# Patient Record
Sex: FEMALE | Race: ASIAN | NOT HISPANIC OR LATINO
[De-identification: names, ages, dates, MRNs, and addresses within clinical notes are randomized per-mention and may not be internally consistent; named-entity substitution may affect disease eponyms.]

---

## 2020-06-09 ENCOUNTER — TRANSCRIPTION ENCOUNTER (OUTPATIENT)
Age: 30
End: 2020-06-09

## 2020-12-10 ENCOUNTER — TRANSCRIPTION ENCOUNTER (OUTPATIENT)
Age: 30
End: 2020-12-10

## 2021-01-23 ENCOUNTER — TRANSCRIPTION ENCOUNTER (OUTPATIENT)
Age: 31
End: 2021-01-23

## 2021-02-16 ENCOUNTER — TRANSCRIPTION ENCOUNTER (OUTPATIENT)
Age: 31
End: 2021-02-16

## 2021-02-23 ENCOUNTER — TRANSCRIPTION ENCOUNTER (OUTPATIENT)
Age: 31
End: 2021-02-23

## 2021-04-30 ENCOUNTER — TRANSCRIPTION ENCOUNTER (OUTPATIENT)
Age: 31
End: 2021-04-30

## 2022-01-06 ENCOUNTER — TRANSCRIPTION ENCOUNTER (OUTPATIENT)
Age: 32
End: 2022-01-06

## 2022-01-17 ENCOUNTER — TRANSCRIPTION ENCOUNTER (OUTPATIENT)
Age: 32
End: 2022-01-17

## 2022-02-10 ENCOUNTER — LABORATORY RESULT (OUTPATIENT)
Age: 32
End: 2022-02-10

## 2022-02-11 ENCOUNTER — APPOINTMENT (OUTPATIENT)
Dept: ANTEPARTUM | Facility: CLINIC | Age: 32
End: 2022-02-11
Payer: COMMERCIAL

## 2022-02-11 ENCOUNTER — ASOB RESULT (OUTPATIENT)
Age: 32
End: 2022-02-11

## 2022-02-11 PROBLEM — Z00.00 ENCOUNTER FOR PREVENTIVE HEALTH EXAMINATION: Status: ACTIVE | Noted: 2022-02-11

## 2022-02-11 PROCEDURE — 76813 OB US NUCHAL MEAS 1 GEST: CPT

## 2022-02-11 PROCEDURE — 76801 OB US < 14 WKS SINGLE FETUS: CPT

## 2022-04-05 ENCOUNTER — APPOINTMENT (OUTPATIENT)
Dept: ANTEPARTUM | Facility: CLINIC | Age: 32
End: 2022-04-05
Payer: COMMERCIAL

## 2022-04-05 ENCOUNTER — ASOB RESULT (OUTPATIENT)
Age: 32
End: 2022-04-05

## 2022-04-05 PROCEDURE — 76817 TRANSVAGINAL US OBSTETRIC: CPT

## 2022-04-05 PROCEDURE — 76805 OB US >/= 14 WKS SNGL FETUS: CPT

## 2022-06-03 ENCOUNTER — ASOB RESULT (OUTPATIENT)
Age: 32
End: 2022-06-03

## 2022-06-03 ENCOUNTER — APPOINTMENT (OUTPATIENT)
Dept: ANTEPARTUM | Facility: CLINIC | Age: 32
End: 2022-06-03
Payer: COMMERCIAL

## 2022-06-03 PROCEDURE — 76816 OB US FOLLOW-UP PER FETUS: CPT

## 2022-06-03 PROCEDURE — 76819 FETAL BIOPHYS PROFIL W/O NST: CPT

## 2022-07-01 ENCOUNTER — ASOB RESULT (OUTPATIENT)
Age: 32
End: 2022-07-01

## 2022-07-01 ENCOUNTER — APPOINTMENT (OUTPATIENT)
Dept: ANTEPARTUM | Facility: CLINIC | Age: 32
End: 2022-07-01

## 2022-07-01 PROCEDURE — 76818 FETAL BIOPHYS PROFILE W/NST: CPT

## 2022-07-01 PROCEDURE — 76816 OB US FOLLOW-UP PER FETUS: CPT

## 2022-08-04 ENCOUNTER — ASOB RESULT (OUTPATIENT)
Age: 32
End: 2022-08-04

## 2022-08-04 ENCOUNTER — APPOINTMENT (OUTPATIENT)
Dept: ANTEPARTUM | Facility: CLINIC | Age: 32
End: 2022-08-04

## 2022-08-04 PROCEDURE — 76819 FETAL BIOPHYS PROFIL W/O NST: CPT

## 2022-08-04 PROCEDURE — 76816 OB US FOLLOW-UP PER FETUS: CPT

## 2022-08-09 ENCOUNTER — OUTPATIENT (OUTPATIENT)
Dept: OUTPATIENT SERVICES | Facility: HOSPITAL | Age: 32
LOS: 1 days | End: 2022-08-09
Payer: COMMERCIAL

## 2022-08-09 DIAGNOSIS — O26.899 OTHER SPECIFIED PREGNANCY RELATED CONDITIONS, UNSPECIFIED TRIMESTER: ICD-10-CM

## 2022-08-09 DIAGNOSIS — Z3A.00 WEEKS OF GESTATION OF PREGNANCY NOT SPECIFIED: ICD-10-CM

## 2022-08-09 PROCEDURE — 99214 OFFICE O/P EST MOD 30 MIN: CPT

## 2022-08-09 PROCEDURE — 99218: CPT | Mod: 25

## 2022-08-09 PROCEDURE — 59025 FETAL NON-STRESS TEST: CPT | Mod: 26

## 2022-08-09 PROCEDURE — 76815 OB US LIMITED FETUS(S): CPT | Mod: 26

## 2022-08-16 ENCOUNTER — INPATIENT (INPATIENT)
Facility: HOSPITAL | Age: 32
LOS: 2 days | Discharge: HOME | End: 2022-08-19
Attending: OBSTETRICS & GYNECOLOGY | Admitting: OBSTETRICS & GYNECOLOGY
Payer: COMMERCIAL

## 2022-08-16 VITALS — HEIGHT: 61 IN | WEIGHT: 169.09 LBS

## 2022-08-16 DIAGNOSIS — O26.899 OTHER SPECIFIED PREGNANCY RELATED CONDITIONS, UNSPECIFIED TRIMESTER: ICD-10-CM

## 2022-08-16 DIAGNOSIS — Z3A.00 WEEKS OF GESTATION OF PREGNANCY NOT SPECIFIED: ICD-10-CM

## 2022-08-16 LAB
BASOPHILS # BLD AUTO: 0.06 K/UL — SIGNIFICANT CHANGE UP (ref 0–0.2)
BASOPHILS NFR BLD AUTO: 0.4 % — SIGNIFICANT CHANGE UP (ref 0–2)
BLD GP AB SCN SERPL QL: NEGATIVE — SIGNIFICANT CHANGE UP
EOSINOPHIL # BLD AUTO: 0.07 K/UL — SIGNIFICANT CHANGE UP (ref 0–0.5)
EOSINOPHIL NFR BLD AUTO: 0.4 % — SIGNIFICANT CHANGE UP (ref 0–6)
HCT VFR BLD CALC: 38.7 % — SIGNIFICANT CHANGE UP (ref 34.5–45)
HGB BLD-MCNC: 12.6 G/DL — SIGNIFICANT CHANGE UP (ref 11.5–15.5)
IMM GRANULOCYTES NFR BLD AUTO: 1.5 % — SIGNIFICANT CHANGE UP (ref 0–1.5)
LYMPHOCYTES # BLD AUTO: 13.1 % — SIGNIFICANT CHANGE UP (ref 13–44)
LYMPHOCYTES # BLD AUTO: 2.21 K/UL — SIGNIFICANT CHANGE UP (ref 1–3.3)
MCHC RBC-ENTMCNC: 29.1 PG — SIGNIFICANT CHANGE UP (ref 27–34)
MCHC RBC-ENTMCNC: 32.6 GM/DL — SIGNIFICANT CHANGE UP (ref 32–36)
MCV RBC AUTO: 89.4 FL — SIGNIFICANT CHANGE UP (ref 80–100)
MONOCYTES # BLD AUTO: 0.92 K/UL — HIGH (ref 0–0.9)
MONOCYTES NFR BLD AUTO: 5.5 % — SIGNIFICANT CHANGE UP (ref 2–14)
NEUTROPHILS # BLD AUTO: 13.32 K/UL — HIGH (ref 1.8–7.4)
NEUTROPHILS NFR BLD AUTO: 79.1 % — HIGH (ref 43–77)
NRBC # BLD: 0 /100 WBCS — SIGNIFICANT CHANGE UP (ref 0–0)
PLATELET # BLD AUTO: 258 K/UL — SIGNIFICANT CHANGE UP (ref 150–400)
RBC # BLD: 4.33 M/UL — SIGNIFICANT CHANGE UP (ref 3.8–5.2)
RBC # FLD: 14.6 % — HIGH (ref 10.3–14.5)
RH IG SCN BLD-IMP: POSITIVE — SIGNIFICANT CHANGE UP
SARS-COV-2 RNA SPEC QL NAA+PROBE: NEGATIVE — SIGNIFICANT CHANGE UP
WBC # BLD: 16.84 K/UL — HIGH (ref 3.8–10.5)
WBC # FLD AUTO: 16.84 K/UL — HIGH (ref 3.8–10.5)

## 2022-08-16 PROCEDURE — 59025 FETAL NON-STRESS TEST: CPT | Mod: 26

## 2022-08-16 PROCEDURE — 76815 OB US LIMITED FETUS(S): CPT | Mod: 26

## 2022-08-16 PROCEDURE — 99218: CPT | Mod: 25

## 2022-08-16 RX ORDER — CITRIC ACID/SODIUM CITRATE 300-500 MG
15 SOLUTION, ORAL ORAL EVERY 6 HOURS
Refills: 0 | Status: DISCONTINUED | OUTPATIENT
Start: 2022-08-16 | End: 2022-08-17

## 2022-08-16 RX ORDER — FENTANYL/BUPIVACAINE/NS/PF 2MCG/ML-.1
250 PLASTIC BAG, INJECTION (ML) INJECTION
Refills: 0 | Status: DISCONTINUED | OUTPATIENT
Start: 2022-08-16 | End: 2022-08-17

## 2022-08-16 RX ORDER — CHLORHEXIDINE GLUCONATE 213 G/1000ML
1 SOLUTION TOPICAL ONCE
Refills: 0 | Status: DISCONTINUED | OUTPATIENT
Start: 2022-08-16 | End: 2022-08-17

## 2022-08-16 RX ORDER — SODIUM CHLORIDE 9 MG/ML
1000 INJECTION, SOLUTION INTRAVENOUS
Refills: 0 | Status: DISCONTINUED | OUTPATIENT
Start: 2022-08-16 | End: 2022-08-17

## 2022-08-16 RX ORDER — OXYTOCIN 10 UNIT/ML
333.33 VIAL (ML) INJECTION
Qty: 20 | Refills: 0 | Status: DISCONTINUED | OUTPATIENT
Start: 2022-08-16 | End: 2022-08-17

## 2022-08-16 RX ADMIN — SODIUM CHLORIDE 125 MILLILITER(S): 9 INJECTION, SOLUTION INTRAVENOUS at 23:09

## 2022-08-16 NOTE — PATIENT PROFILE OB - FALL HARM RISK - UNIVERSAL INTERVENTIONS
Bed in lowest position, wheels locked, appropriate side rails in place/Call bell, personal items and telephone in reach/Instruct patient to call for assistance before getting out of bed or chair/Non-slip footwear when patient is out of bed/[place] to call system/Physically safe environment - no spills, clutter or unnecessary equipment/Purposeful Proactive Rounding/Room/bathroom lighting operational, light cord in reach

## 2022-08-17 ENCOUNTER — RESULT REVIEW (OUTPATIENT)
Age: 32
End: 2022-08-17

## 2022-08-17 LAB
ALBUMIN SERPL ELPH-MCNC: 2.5 G/DL — LOW (ref 3.3–5)
ALP SERPL-CCNC: 95 U/L — SIGNIFICANT CHANGE UP (ref 40–120)
ALT FLD-CCNC: 9 U/L — LOW (ref 10–45)
ANION GAP SERPL CALC-SCNC: 12 MMOL/L — SIGNIFICANT CHANGE UP (ref 5–17)
ANISOCYTOSIS BLD QL: SLIGHT — SIGNIFICANT CHANGE UP
APTT BLD: 29.4 SEC — SIGNIFICANT CHANGE UP (ref 27.5–35.5)
AST SERPL-CCNC: 23 U/L — SIGNIFICANT CHANGE UP (ref 10–40)
BASOPHILS # BLD AUTO: 0 K/UL — SIGNIFICANT CHANGE UP (ref 0–0.2)
BASOPHILS NFR BLD AUTO: 0 % — SIGNIFICANT CHANGE UP (ref 0–2)
BILIRUB SERPL-MCNC: 0.5 MG/DL — SIGNIFICANT CHANGE UP (ref 0.2–1.2)
BUN SERPL-MCNC: 6 MG/DL — LOW (ref 7–23)
CALCIUM SERPL-MCNC: 8.5 MG/DL — SIGNIFICANT CHANGE UP (ref 8.4–10.5)
CHLORIDE SERPL-SCNC: 106 MMOL/L — SIGNIFICANT CHANGE UP (ref 96–108)
CO2 SERPL-SCNC: 19 MMOL/L — LOW (ref 22–31)
COVID-19 SPIKE DOMAIN AB INTERP: POSITIVE
COVID-19 SPIKE DOMAIN ANTIBODY RESULT: >250 U/ML — HIGH
CREAT SERPL-MCNC: 0.64 MG/DL — SIGNIFICANT CHANGE UP (ref 0.5–1.3)
EGFR: 120 ML/MIN/1.73M2 — SIGNIFICANT CHANGE UP
EOSINOPHIL # BLD AUTO: 0 K/UL — SIGNIFICANT CHANGE UP (ref 0–0.5)
EOSINOPHIL NFR BLD AUTO: 0 % — SIGNIFICANT CHANGE UP (ref 0–6)
FIBRINOGEN PPP-MCNC: 568 MG/DL — HIGH (ref 258–438)
GIANT PLATELETS BLD QL SMEAR: PRESENT — SIGNIFICANT CHANGE UP
GLUCOSE SERPL-MCNC: 173 MG/DL — HIGH (ref 70–99)
HCT VFR BLD CALC: 28.7 % — LOW (ref 34.5–45)
HGB BLD-MCNC: 9.6 G/DL — LOW (ref 11.5–15.5)
INR BLD: 1.01 — SIGNIFICANT CHANGE UP (ref 0.88–1.16)
LDH SERPL L TO P-CCNC: 180 U/L — SIGNIFICANT CHANGE UP (ref 50–242)
LYMPHOCYTES # BLD AUTO: 0.8 K/UL — LOW (ref 1–3.3)
LYMPHOCYTES # BLD AUTO: 3.5 % — LOW (ref 13–44)
MACROCYTES BLD QL: SLIGHT — SIGNIFICANT CHANGE UP
MANUAL SMEAR VERIFICATION: SIGNIFICANT CHANGE UP
MCHC RBC-ENTMCNC: 29.6 PG — SIGNIFICANT CHANGE UP (ref 27–34)
MCHC RBC-ENTMCNC: 33.4 GM/DL — SIGNIFICANT CHANGE UP (ref 32–36)
MCV RBC AUTO: 88.6 FL — SIGNIFICANT CHANGE UP (ref 80–100)
MICROCYTES BLD QL: SLIGHT — SIGNIFICANT CHANGE UP
MONOCYTES # BLD AUTO: 1.37 K/UL — HIGH (ref 0–0.9)
MONOCYTES NFR BLD AUTO: 6 % — SIGNIFICANT CHANGE UP (ref 2–14)
NEUTROPHILS # BLD AUTO: 20.66 K/UL — HIGH (ref 1.8–7.4)
NEUTROPHILS NFR BLD AUTO: 85.3 % — HIGH (ref 43–77)
NEUTS BAND # BLD: 5.2 % — SIGNIFICANT CHANGE UP (ref 0–8)
OVALOCYTES BLD QL SMEAR: SLIGHT — SIGNIFICANT CHANGE UP
PLAT MORPH BLD: ABNORMAL
PLATELET # BLD AUTO: 174 K/UL — SIGNIFICANT CHANGE UP (ref 150–400)
POLYCHROMASIA BLD QL SMEAR: SLIGHT — SIGNIFICANT CHANGE UP
POTASSIUM SERPL-MCNC: 3.8 MMOL/L — SIGNIFICANT CHANGE UP (ref 3.5–5.3)
POTASSIUM SERPL-SCNC: 3.8 MMOL/L — SIGNIFICANT CHANGE UP (ref 3.5–5.3)
PROT SERPL-MCNC: 5.1 G/DL — LOW (ref 6–8.3)
PROTHROM AB SERPL-ACNC: 12 SEC — SIGNIFICANT CHANGE UP (ref 10.5–13.4)
RBC # BLD: 3.24 M/UL — LOW (ref 3.8–5.2)
RBC # FLD: 15 % — HIGH (ref 10.3–14.5)
RBC BLD AUTO: ABNORMAL
RH IG SCN BLD-IMP: POSITIVE — SIGNIFICANT CHANGE UP
SARS-COV-2 IGG+IGM SERPL QL IA: >250 U/ML — HIGH
SARS-COV-2 IGG+IGM SERPL QL IA: POSITIVE
SODIUM SERPL-SCNC: 137 MMOL/L — SIGNIFICANT CHANGE UP (ref 135–145)
SPHEROCYTES BLD QL SMEAR: SLIGHT — SIGNIFICANT CHANGE UP
T PALLIDUM AB TITR SER: NEGATIVE — SIGNIFICANT CHANGE UP
URATE SERPL-MCNC: 5.8 MG/DL — SIGNIFICANT CHANGE UP (ref 2.5–7)
WBC # BLD: 22.83 K/UL — HIGH (ref 3.8–10.5)
WBC # FLD AUTO: 22.83 K/UL — HIGH (ref 3.8–10.5)

## 2022-08-17 PROCEDURE — 88307 TISSUE EXAM BY PATHOLOGIST: CPT | Mod: 26

## 2022-08-17 RX ORDER — LANOLIN
1 OINTMENT (GRAM) TOPICAL EVERY 6 HOURS
Refills: 0 | Status: DISCONTINUED | OUTPATIENT
Start: 2022-08-17 | End: 2022-08-19

## 2022-08-17 RX ORDER — BENZOCAINE 10 %
1 GEL (GRAM) MUCOUS MEMBRANE EVERY 6 HOURS
Refills: 0 | Status: DISCONTINUED | OUTPATIENT
Start: 2022-08-17 | End: 2022-08-19

## 2022-08-17 RX ORDER — MAGNESIUM HYDROXIDE 400 MG/1
30 TABLET, CHEWABLE ORAL
Refills: 0 | Status: DISCONTINUED | OUTPATIENT
Start: 2022-08-17 | End: 2022-08-19

## 2022-08-17 RX ORDER — OXYTOCIN 10 UNIT/ML
333.33 VIAL (ML) INJECTION
Qty: 20 | Refills: 0 | Status: DISCONTINUED | OUTPATIENT
Start: 2022-08-17 | End: 2022-08-19

## 2022-08-17 RX ORDER — GENTAMICIN SULFATE 40 MG/ML
380 VIAL (ML) INJECTION ONCE
Refills: 0 | Status: DISCONTINUED | OUTPATIENT
Start: 2022-08-17 | End: 2022-08-17

## 2022-08-17 RX ORDER — IBUPROFEN 200 MG
600 TABLET ORAL EVERY 6 HOURS
Refills: 0 | Status: COMPLETED | OUTPATIENT
Start: 2022-08-17 | End: 2023-07-16

## 2022-08-17 RX ORDER — HYDROCORTISONE 1 %
1 OINTMENT (GRAM) TOPICAL EVERY 6 HOURS
Refills: 0 | Status: DISCONTINUED | OUTPATIENT
Start: 2022-08-17 | End: 2022-08-19

## 2022-08-17 RX ORDER — AMPICILLIN TRIHYDRATE 250 MG
2 CAPSULE ORAL EVERY 6 HOURS
Refills: 0 | Status: DISCONTINUED | OUTPATIENT
Start: 2022-08-17 | End: 2022-08-17

## 2022-08-17 RX ORDER — ACETAMINOPHEN 500 MG
975 TABLET ORAL
Refills: 0 | Status: DISCONTINUED | OUTPATIENT
Start: 2022-08-17 | End: 2022-08-19

## 2022-08-17 RX ORDER — SODIUM CHLORIDE 9 MG/ML
3 INJECTION INTRAMUSCULAR; INTRAVENOUS; SUBCUTANEOUS EVERY 8 HOURS
Refills: 0 | Status: DISCONTINUED | OUTPATIENT
Start: 2022-08-17 | End: 2022-08-19

## 2022-08-17 RX ORDER — SIMETHICONE 80 MG/1
80 TABLET, CHEWABLE ORAL EVERY 4 HOURS
Refills: 0 | Status: DISCONTINUED | OUTPATIENT
Start: 2022-08-17 | End: 2022-08-19

## 2022-08-17 RX ORDER — ACETAMINOPHEN 500 MG
975 TABLET ORAL ONCE
Refills: 0 | Status: DISCONTINUED | OUTPATIENT
Start: 2022-08-17 | End: 2022-08-17

## 2022-08-17 RX ORDER — KETOROLAC TROMETHAMINE 30 MG/ML
30 SYRINGE (ML) INJECTION ONCE
Refills: 0 | Status: DISCONTINUED | OUTPATIENT
Start: 2022-08-17 | End: 2022-08-17

## 2022-08-17 RX ORDER — OXYCODONE HYDROCHLORIDE 5 MG/1
5 TABLET ORAL ONCE
Refills: 0 | Status: DISCONTINUED | OUTPATIENT
Start: 2022-08-17 | End: 2022-08-19

## 2022-08-17 RX ORDER — PRAMOXINE HYDROCHLORIDE 150 MG/15G
1 AEROSOL, FOAM RECTAL EVERY 4 HOURS
Refills: 0 | Status: DISCONTINUED | OUTPATIENT
Start: 2022-08-17 | End: 2022-08-19

## 2022-08-17 RX ORDER — AER TRAVELER 0.5 G/1
1 SOLUTION RECTAL; TOPICAL EVERY 4 HOURS
Refills: 0 | Status: DISCONTINUED | OUTPATIENT
Start: 2022-08-17 | End: 2022-08-19

## 2022-08-17 RX ORDER — ACETAMINOPHEN 500 MG
1000 TABLET ORAL ONCE
Refills: 0 | Status: COMPLETED | OUTPATIENT
Start: 2022-08-17 | End: 2022-08-17

## 2022-08-17 RX ORDER — IBUPROFEN 200 MG
600 TABLET ORAL EVERY 6 HOURS
Refills: 0 | Status: DISCONTINUED | OUTPATIENT
Start: 2022-08-17 | End: 2022-08-19

## 2022-08-17 RX ORDER — ONDANSETRON 8 MG/1
8 TABLET, FILM COATED ORAL ONCE
Refills: 0 | Status: DISCONTINUED | OUTPATIENT
Start: 2022-08-17 | End: 2022-08-17

## 2022-08-17 RX ORDER — DIBUCAINE 1 %
1 OINTMENT (GRAM) RECTAL EVERY 6 HOURS
Refills: 0 | Status: DISCONTINUED | OUTPATIENT
Start: 2022-08-17 | End: 2022-08-19

## 2022-08-17 RX ORDER — OXYCODONE HYDROCHLORIDE 5 MG/1
5 TABLET ORAL
Refills: 0 | Status: DISCONTINUED | OUTPATIENT
Start: 2022-08-17 | End: 2022-08-19

## 2022-08-17 RX ORDER — DIPHENHYDRAMINE HCL 50 MG
25 CAPSULE ORAL EVERY 6 HOURS
Refills: 0 | Status: DISCONTINUED | OUTPATIENT
Start: 2022-08-17 | End: 2022-08-19

## 2022-08-17 RX ORDER — TETANUS TOXOID, REDUCED DIPHTHERIA TOXOID AND ACELLULAR PERTUSSIS VACCINE, ADSORBED 5; 2.5; 8; 8; 2.5 [IU]/.5ML; [IU]/.5ML; UG/.5ML; UG/.5ML; UG/.5ML
0.5 SUSPENSION INTRAMUSCULAR ONCE
Refills: 0 | Status: DISCONTINUED | OUTPATIENT
Start: 2022-08-17 | End: 2022-08-19

## 2022-08-17 RX ADMIN — SODIUM CHLORIDE 3 MILLILITER(S): 9 INJECTION INTRAMUSCULAR; INTRAVENOUS; SUBCUTANEOUS at 15:23

## 2022-08-17 RX ADMIN — Medication 600 MILLIGRAM(S): at 19:00

## 2022-08-17 RX ADMIN — Medication 975 MILLIGRAM(S): at 21:45

## 2022-08-17 RX ADMIN — Medication 600 MILLIGRAM(S): at 18:13

## 2022-08-17 RX ADMIN — Medication 216 GRAM(S): at 10:43

## 2022-08-17 RX ADMIN — Medication 400 MILLIGRAM(S): at 10:28

## 2022-08-17 RX ADMIN — Medication 975 MILLIGRAM(S): at 15:52

## 2022-08-17 RX ADMIN — Medication 975 MILLIGRAM(S): at 22:45

## 2022-08-17 RX ADMIN — Medication 30 MILLIGRAM(S): at 12:55

## 2022-08-17 RX ADMIN — Medication 975 MILLIGRAM(S): at 16:40

## 2022-08-17 RX ADMIN — Medication 1000 MILLIGRAM(S): at 10:29

## 2022-08-18 RX ADMIN — Medication 600 MILLIGRAM(S): at 08:30

## 2022-08-18 RX ADMIN — PRAMOXINE HYDROCHLORIDE 1 APPLICATION(S): 150 AEROSOL, FOAM RECTAL at 22:36

## 2022-08-18 RX ADMIN — Medication 600 MILLIGRAM(S): at 22:15

## 2022-08-18 RX ADMIN — Medication 1 APPLICATION(S): at 21:54

## 2022-08-18 RX ADMIN — Medication 975 MILLIGRAM(S): at 18:41

## 2022-08-18 RX ADMIN — Medication 975 MILLIGRAM(S): at 17:42

## 2022-08-18 RX ADMIN — SODIUM CHLORIDE 3 MILLILITER(S): 9 INJECTION INTRAMUSCULAR; INTRAVENOUS; SUBCUTANEOUS at 13:25

## 2022-08-18 RX ADMIN — Medication 600 MILLIGRAM(S): at 16:00

## 2022-08-18 RX ADMIN — Medication 975 MILLIGRAM(S): at 13:00

## 2022-08-18 RX ADMIN — SODIUM CHLORIDE 3 MILLILITER(S): 9 INJECTION INTRAMUSCULAR; INTRAVENOUS; SUBCUTANEOUS at 21:00

## 2022-08-18 RX ADMIN — Medication 600 MILLIGRAM(S): at 09:30

## 2022-08-18 RX ADMIN — Medication 600 MILLIGRAM(S): at 15:08

## 2022-08-18 RX ADMIN — Medication 1 TABLET(S): at 12:05

## 2022-08-18 RX ADMIN — Medication 975 MILLIGRAM(S): at 12:04

## 2022-08-18 RX ADMIN — Medication 600 MILLIGRAM(S): at 21:29

## 2022-08-18 NOTE — LACTATION INITIAL EVALUATION - LACTATION INTERVENTIONS
initiate/review safe skin-to-skin/initiate/review hand expression/post discharge community resources provided/initiate/review supplementation plan due to medical indications/reviewed components of an effective feeding and at least 8 effective feedings per day required/reviewed importance of monitoring infant diapers, the breastfeeding log, and minimum output each day/reviewed benefits and recommendations for rooming in/reviewed feeding on demand/by cue at least 8 times a day/recommended follow-up with pediatrician within 24 hours of discharge

## 2022-08-18 NOTE — LACTATION INITIAL EVALUATION - NS LACT CON REASON FOR REQ
bottles in WBN overnight, plans to combo feed through d/c./general questions without assessment/primaparous mom

## 2022-08-19 ENCOUNTER — TRANSCRIPTION ENCOUNTER (OUTPATIENT)
Age: 32
End: 2022-08-19

## 2022-08-19 VITALS
RESPIRATION RATE: 18 BRPM | HEART RATE: 102 BPM | TEMPERATURE: 98 F | DIASTOLIC BLOOD PRESSURE: 56 MMHG | SYSTOLIC BLOOD PRESSURE: 90 MMHG | OXYGEN SATURATION: 97 %

## 2022-08-19 PROCEDURE — 86901 BLOOD TYPING SEROLOGIC RH(D): CPT

## 2022-08-19 PROCEDURE — 86850 RBC ANTIBODY SCREEN: CPT

## 2022-08-19 PROCEDURE — 36415 COLL VENOUS BLD VENIPUNCTURE: CPT

## 2022-08-19 PROCEDURE — 88307 TISSUE EXAM BY PATHOLOGIST: CPT

## 2022-08-19 PROCEDURE — 87635 SARS-COV-2 COVID-19 AMP PRB: CPT

## 2022-08-19 PROCEDURE — 99214 OFFICE O/P EST MOD 30 MIN: CPT

## 2022-08-19 PROCEDURE — 85730 THROMBOPLASTIN TIME PARTIAL: CPT

## 2022-08-19 PROCEDURE — 86900 BLOOD TYPING SEROLOGIC ABO: CPT

## 2022-08-19 PROCEDURE — 85610 PROTHROMBIN TIME: CPT

## 2022-08-19 PROCEDURE — 59050 FETAL MONITOR W/REPORT: CPT

## 2022-08-19 PROCEDURE — 86780 TREPONEMA PALLIDUM: CPT

## 2022-08-19 PROCEDURE — 86769 SARS-COV-2 COVID-19 ANTIBODY: CPT

## 2022-08-19 PROCEDURE — 85025 COMPLETE CBC W/AUTO DIFF WBC: CPT

## 2022-08-19 PROCEDURE — 84550 ASSAY OF BLOOD/URIC ACID: CPT

## 2022-08-19 PROCEDURE — 83615 LACTATE (LD) (LDH) ENZYME: CPT

## 2022-08-19 PROCEDURE — 80053 COMPREHEN METABOLIC PANEL: CPT

## 2022-08-19 PROCEDURE — 85384 FIBRINOGEN ACTIVITY: CPT

## 2022-08-19 RX ORDER — ACETAMINOPHEN 500 MG
3 TABLET ORAL
Qty: 0 | Refills: 0 | DISCHARGE
Start: 2022-08-19

## 2022-08-19 RX ORDER — IBUPROFEN 200 MG
1 TABLET ORAL
Qty: 0 | Refills: 0 | DISCHARGE
Start: 2022-08-19

## 2022-08-19 RX ADMIN — Medication 600 MILLIGRAM(S): at 12:00

## 2022-08-19 RX ADMIN — Medication 600 MILLIGRAM(S): at 04:00

## 2022-08-19 RX ADMIN — Medication 975 MILLIGRAM(S): at 10:30

## 2022-08-19 RX ADMIN — Medication 975 MILLIGRAM(S): at 01:00

## 2022-08-19 RX ADMIN — Medication 325 MILLIGRAM(S): at 00:23

## 2022-08-19 RX ADMIN — Medication 600 MILLIGRAM(S): at 11:15

## 2022-08-19 RX ADMIN — Medication 600 MILLIGRAM(S): at 05:00

## 2022-08-19 RX ADMIN — Medication 1 TABLET(S): at 11:14

## 2022-08-19 RX ADMIN — Medication 325 MILLIGRAM(S): at 09:59

## 2022-08-19 NOTE — DISCHARGE NOTE OB - PATIENT PORTAL LINK FT
You can access the FollowMyHealth Patient Portal offered by [facility] by registering at the following website: [email or web address]. By joining [email or web address]’s FollowMyHealth portal, you will also be able to view your health information using other applications (apps) compatible with our system.

## 2022-08-19 NOTE — DISCHARGE NOTE OB - MEDICATION SUMMARY - MEDICATIONS TO TAKE
I will START or STAY ON the medications listed below when I get home from the hospital:    acetaminophen 325 mg oral tablet  -- 3 tab(s) by mouth every 6 hours  -- Indication: For Pain    ibuprofen 600 mg oral tablet  -- 1 tab(s) by mouth every 6 hours  -- Indication: For Pain    Prenatal Multivitamins with Folic Acid 1 mg oral tablet  -- 1 tab(s) by mouth once a day  -- Indication: For Postpartum

## 2022-08-19 NOTE — DISCHARGE NOTE OB - CARE PLAN
Principal Discharge DX:	Postpartum state  Assessment and plan of treatment:	Take Motrin 600mg every 6 hours and/or tylenol 650mg every 6 hours as needed for pain. Call your OB to schedule a follow up appointment in 6 weeks. Nothing per vagina until cleared by your OB - no intercourse, douching, tampons, etc.  Call your OB if you experience severe abdominal pain not improved by oral pain medications, heavy bright red vaginal bleeding saturating more than 1 pad per hour, or fever greater than 100.4F. Consider contraception options to be discussed with your OB.  Secondary Diagnosis:	Chorioamnionitis  Assessment and plan of treatment:	Chorioamnionitis diagnosed intrapartum. S/p A/G. Afebrile now.   1

## 2022-08-19 NOTE — DISCHARGE NOTE OB - NS MD DC FALL RISK RISK
For information on Fall & Injury Prevention, visit: [email or web address] OR  [email or web address] OR  https://www.cdc.gov/steadi/patient.html

## 2022-08-19 NOTE — PROGRESS NOTE ADULT - SUBJECTIVE AND OBJECTIVE BOX
Patient evaluated at bedside this morning, resting comfortable in bed, no acute events overnight.  She reports pain is well controlled with tylenol and motrin. No toxic symptoms such as headache, RUQ/epigastric pain, blurry vision.  She denies dizziness, chest pain, palpitations, shortness of breath, nausea, vomiting, heavy vaginal bleeding or perineal discomfort. Reports decrease in amount of vaginal bleeding and denies clots.  She has been ambulating without assistance, voiding spontaneously.  Tolerating food well, without nausea/vomit.      Physical Exam:  T(C): 36.9 (08-18-22 @ 06:00), Max: 37.1 (08-17-22 @ 22:00)  HR: 98 (08-18-22 @ 06:00) (86 - 98)  BP: 86/53 (08-18-22 @ 06:01) (84/49 - 92/58)  RR: 18 (08-18-22 @ 02:00) (18 - 18)  SpO2: 96% (08-18-22 @ 06:00) (96% - 98%)    GA: NAD, comfortable, conversational  Abd: soft, nontender, nondistended, no rebound or guarding, uterus firm 1 fb below umbilicus  Extremities: no swelling or calf tenderness  Perineum: lochia wnl                          9.6    22.83 )-----------( 174      ( 17 Aug 2022 14:28 )             28.7     08-17    137  |  106  |  6<L>  ----------------------------<  173<H>  3.8   |  19<L>  |  0.64    Ca    8.5      17 Aug 2022 14:28    TPro  5.1<L>  /  Alb  2.5<L>  /  TBili  0.5  /  DBili  x   /  AST  23  /  ALT  9<L>  /  AlkPhos  95  08-17    acetaminophen     Tablet .. 975 milliGRAM(s) Oral <User Schedule>  benzocaine 20%/menthol 0.5% Spray 1 Spray(s) Topical every 6 hours PRN  dibucaine 1% Ointment 1 Application(s) Topical every 6 hours PRN  diphenhydrAMINE 25 milliGRAM(s) Oral every 6 hours PRN  diphtheria/tetanus/pertussis (acellular) Vaccine (ADAcel) 0.5 milliLiter(s) IntraMuscular once  hydrocortisone 1% Cream 1 Application(s) Topical every 6 hours PRN  ibuprofen  Tablet. 600 milliGRAM(s) Oral every 6 hours  lanolin Ointment 1 Application(s) Topical every 6 hours PRN  magnesium hydroxide Suspension 30 milliLiter(s) Oral two times a day PRN  oxyCODONE    IR 5 milliGRAM(s) Oral every 3 hours PRN  oxyCODONE    IR 5 milliGRAM(s) Oral once PRN  oxytocin Infusion 333.333 milliUNIT(s)/Min IV Continuous <Continuous>  pramoxine 1%/zinc 5% Cream 1 Application(s) Topical every 4 hours PRN  prenatal multivitamin 1 Tablet(s) Oral daily  simethicone 80 milliGRAM(s) Chew every 4 hours PRN  sodium chloride 0.9% lock flush 3 milliLiter(s) IV Push every 8 hours  witch hazel Pads 1 Application(s) Topical every 4 hours PRN  
Patient evaluated at bedside this morning, resting comfortable in bed, no acute events overnight.  She reports pain is well controlled with tylenol and motrin. No toxic symptoms ushc as headache, vision changes, RUQ/epigastric pain.  She denies dizziness, chest pain, palpitations, shortness of breath, nausea, vomiting, heavy vaginal bleeding or perineal discomfort. Reports decrease in amount of vaginal bleeding and denies clots.  She has been ambulating without assistance, voiding spontaneously.  Tolerating food well, without nausea/vomit.      Physical Exam:  T(C): 36.8 (08-19-22 @ 06:30), Max: 36.8 (08-19-22 @ 06:30)  HR: 102 (08-19-22 @ 06:30) (98 - 102)  BP: 90/56 (08-19-22 @ 06:30) (90/56 - 104/69)  RR: 18 (08-19-22 @ 06:30) (17 - 18)  SpO2: 97% (08-19-22 @ 06:30) (97% - 99%)    GA: NAD, comfortable, conversational  Abd: soft, nontender, nondistended, no rebound or guarding, uterus firm.  Extremities: no swelling or calf tenderness  Perineum: lochia less than menses, intact, healing well, no hematoma                          9.6    22.83 )-----------( 174      ( 17 Aug 2022 14:28 )             28.7     08-17    137  |  106  |  6<L>  ----------------------------<  173<H>  3.8   |  19<L>  |  0.64    Ca    8.5      17 Aug 2022 14:28    TPro  5.1<L>  /  Alb  2.5<L>  /  TBili  0.5  /  DBili  x   /  AST  23  /  ALT  9<L>  /  AlkPhos  95  08-17    acetaminophen     Tablet .. 975 milliGRAM(s) Oral <User Schedule>  benzocaine 20%/menthol 0.5% Spray 1 Spray(s) Topical every 6 hours PRN  dibucaine 1% Ointment 1 Application(s) Topical every 6 hours PRN  diphenhydrAMINE 25 milliGRAM(s) Oral every 6 hours PRN  diphtheria/tetanus/pertussis (acellular) Vaccine (ADAcel) 0.5 milliLiter(s) IntraMuscular once  hydrocortisone 1% Cream 1 Application(s) Topical every 6 hours PRN  ibuprofen  Tablet. 600 milliGRAM(s) Oral every 6 hours  lanolin Ointment 1 Application(s) Topical every 6 hours PRN  magnesium hydroxide Suspension 30 milliLiter(s) Oral two times a day PRN  oxyCODONE    IR 5 milliGRAM(s) Oral every 3 hours PRN  oxyCODONE    IR 5 milliGRAM(s) Oral once PRN  oxytocin Infusion 333.333 milliUNIT(s)/Min IV Continuous <Continuous>  pramoxine 1%/zinc 5% Cream 1 Application(s) Topical every 4 hours PRN  prenatal multivitamin 1 Tablet(s) Oral daily  simethicone 80 milliGRAM(s) Chew every 4 hours PRN  sodium chloride 0.9% lock flush 3 milliLiter(s) IV Push every 8 hours  witch hazel Pads 1 Application(s) Topical every 4 hours PRN

## 2022-08-19 NOTE — PROGRESS NOTE ADULT - ASSESSMENT
A/P 32y s/p , PPD#2 r/o gHTN c/b Chorio , stable, meeting postpartum milestones   - r/o gHTN: BPs well controlled, no toxic symptoms  - Chorio: s/p IV A/G  - Pain: well controlled on tylenol/motrin  - GI: Tolerating regular diet  - : urinating without difficulty/pain  - DVT prophylaxis: ambulating frequently  - Dispo: PPD 2, unless otherwise specified    
A/P 32y s/p , PPD#1 c/b Chorio and r/o gHTN  , stable, meeting postpartum milestones   - Chorio: Afebrile, asymptomatic, last WBC elevated at 22.8, on IV amp/gent to be d/c at 1045 today  - r/o gHTN: BPs well controlled, no toxic complaints per patient  - Pain: well controlled on tylenol/motrin  - GI: Tolerating regular diet  - : urinating without difficulty/pain  - DVT prophylaxis: ambulating frequently  - Dispo: PPD 2, unless otherwise specified

## 2022-08-19 NOTE — DISCHARGE NOTE OB - PLAN OF CARE
Take Motrin 600mg every 6 hours and/or tylenol 650mg every 6 hours as needed for pain. Call your OB to schedule a follow up appointment in 6 weeks. Nothing per vagina until cleared by your OB - no intercourse, douching, tampons, etc.  Call your OB if you experience severe abdominal pain not improved by oral pain medications, heavy bright red vaginal bleeding saturating more than 1 pad per hour, or fever greater than 100.4F. Consider contraception options to be discussed with your OB. Chorioamnionitis diagnosed intrapartum. S/p A/G. Afebrile now.

## 2022-08-19 NOTE — DISCHARGE NOTE OB - HOSPITAL COURSE
Patient is status post a vaginal delivery, c/b chorioamnionitis. She is s/p A/G, afebrile and  has met her postpartum milestones appropriately.  Vitals are stable for discharge.

## 2022-08-19 NOTE — DISCHARGE NOTE OB - CARE PROVIDER_API CALL
Not applicable
Lilian Betancur (DO; MS)  [facility] Obstetrics and Gynecology  [address] [place] [address]  Phone: [phone]  Fax: [phone]  Follow Up Time:

## 2022-08-23 DIAGNOSIS — O34.13 MATERNAL CARE FOR BENIGN TUMOR OF CORPUS UTERI, THIRD TRIMESTER: ICD-10-CM

## 2022-08-23 DIAGNOSIS — O41.1230 CHORIOAMNIONITIS, THIRD TRIMESTER, NOT APPLICABLE OR UNSPECIFIED: ICD-10-CM

## 2022-08-23 DIAGNOSIS — Z3A.39 39 WEEKS GESTATION OF PREGNANCY: ICD-10-CM

## 2022-08-23 DIAGNOSIS — Z34.83 ENCOUNTER FOR SUPERVISION OF OTHER NORMAL PREGNANCY, THIRD TRIMESTER: ICD-10-CM

## 2022-08-23 DIAGNOSIS — D25.9 LEIOMYOMA OF UTERUS, UNSPECIFIED: ICD-10-CM

## 2022-08-24 LAB — SURGICAL PATHOLOGY STUDY: SIGNIFICANT CHANGE UP

## 2022-08-24 NOTE — PATIENT PROFILE OB - VISION (WITH CORRECTIVE LENSES IF THE PATIENT USUALLY WEARS THEM):
Called pt with lab results being okay except for glucose and A1c.  Pt to start treatment as discussed at last appt and recheck labs in 4 months.  Orders place and appt scheudled.   Normal vision: sees adequately in most situations; can see medication labels, newsprint

## 2022-08-30 ENCOUNTER — NON-APPOINTMENT (OUTPATIENT)
Age: 32
End: 2022-08-30

## 2025-01-06 ENCOUNTER — APPOINTMENT (OUTPATIENT)
Dept: ANTEPARTUM | Facility: CLINIC | Age: 35
End: 2025-01-06

## 2025-01-09 ENCOUNTER — ASOB RESULT (OUTPATIENT)
Age: 35
End: 2025-01-09

## 2025-01-09 ENCOUNTER — APPOINTMENT (OUTPATIENT)
Dept: ANTEPARTUM | Facility: CLINIC | Age: 35
End: 2025-01-09
Payer: COMMERCIAL

## 2025-01-09 PROCEDURE — 76813 OB US NUCHAL MEAS 1 GEST: CPT

## 2025-01-09 PROCEDURE — 93976 VASCULAR STUDY: CPT

## 2025-02-11 ENCOUNTER — ASOB RESULT (OUTPATIENT)
Age: 35
End: 2025-02-11

## 2025-02-11 ENCOUNTER — APPOINTMENT (OUTPATIENT)
Dept: ANTEPARTUM | Facility: CLINIC | Age: 35
End: 2025-02-11
Payer: COMMERCIAL

## 2025-02-11 PROCEDURE — 76805 OB US >/= 14 WKS SNGL FETUS: CPT

## 2025-02-11 PROCEDURE — 76817 TRANSVAGINAL US OBSTETRIC: CPT

## 2025-03-10 ENCOUNTER — ASOB RESULT (OUTPATIENT)
Age: 35
End: 2025-03-10

## 2025-03-10 ENCOUNTER — APPOINTMENT (OUTPATIENT)
Dept: ANTEPARTUM | Facility: CLINIC | Age: 35
End: 2025-03-10

## 2025-03-10 PROCEDURE — 76811 OB US DETAILED SNGL FETUS: CPT

## 2025-03-10 PROCEDURE — 76817 TRANSVAGINAL US OBSTETRIC: CPT | Mod: 59

## 2025-04-28 ENCOUNTER — APPOINTMENT (OUTPATIENT)
Dept: ANTEPARTUM | Facility: CLINIC | Age: 35
End: 2025-04-28
Payer: COMMERCIAL

## 2025-04-28 ENCOUNTER — ASOB RESULT (OUTPATIENT)
Age: 35
End: 2025-04-28

## 2025-04-28 PROCEDURE — 76821 MIDDLE CEREBRAL ARTERY ECHO: CPT | Mod: 59

## 2025-04-28 PROCEDURE — 76820 UMBILICAL ARTERY ECHO: CPT | Mod: 59

## 2025-04-28 PROCEDURE — 76819 FETAL BIOPHYS PROFIL W/O NST: CPT | Mod: 59

## 2025-04-28 PROCEDURE — 76816 OB US FOLLOW-UP PER FETUS: CPT

## 2025-04-28 PROCEDURE — 76817 TRANSVAGINAL US OBSTETRIC: CPT

## 2025-05-19 ENCOUNTER — APPOINTMENT (OUTPATIENT)
Dept: ANTEPARTUM | Facility: CLINIC | Age: 35
End: 2025-05-19
Payer: COMMERCIAL

## 2025-05-19 ENCOUNTER — ASOB RESULT (OUTPATIENT)
Age: 35
End: 2025-05-19

## 2025-05-19 PROCEDURE — 76819 FETAL BIOPHYS PROFIL W/O NST: CPT | Mod: 59

## 2025-05-19 PROCEDURE — 76817 TRANSVAGINAL US OBSTETRIC: CPT

## 2025-05-19 PROCEDURE — 76820 UMBILICAL ARTERY ECHO: CPT | Mod: 59

## 2025-05-19 PROCEDURE — 76816 OB US FOLLOW-UP PER FETUS: CPT

## 2025-05-19 PROCEDURE — 76821 MIDDLE CEREBRAL ARTERY ECHO: CPT | Mod: 59

## 2025-06-16 ENCOUNTER — APPOINTMENT (OUTPATIENT)
Dept: ANTEPARTUM | Facility: CLINIC | Age: 35
End: 2025-06-16
Payer: COMMERCIAL

## 2025-06-16 ENCOUNTER — ASOB RESULT (OUTPATIENT)
Age: 35
End: 2025-06-16

## 2025-06-16 PROCEDURE — 76821 MIDDLE CEREBRAL ARTERY ECHO: CPT | Mod: 59

## 2025-06-16 PROCEDURE — 76816 OB US FOLLOW-UP PER FETUS: CPT

## 2025-06-16 PROCEDURE — 76820 UMBILICAL ARTERY ECHO: CPT | Mod: 59

## 2025-06-16 PROCEDURE — 76819 FETAL BIOPHYS PROFIL W/O NST: CPT | Mod: 59

## 2025-06-24 ENCOUNTER — APPOINTMENT (OUTPATIENT)
Dept: ANTEPARTUM | Facility: CLINIC | Age: 35
End: 2025-06-24
Payer: COMMERCIAL

## 2025-06-24 ENCOUNTER — ASOB RESULT (OUTPATIENT)
Age: 35
End: 2025-06-24

## 2025-06-24 PROCEDURE — 76819 FETAL BIOPHYS PROFIL W/O NST: CPT

## 2025-06-24 PROCEDURE — 76820 UMBILICAL ARTERY ECHO: CPT

## 2025-06-24 PROCEDURE — 76815 OB US LIMITED FETUS(S): CPT

## 2025-07-09 ENCOUNTER — OUTPATIENT (OUTPATIENT)
Dept: OUTPATIENT SERVICES | Facility: HOSPITAL | Age: 35
LOS: 1 days | End: 2025-07-09
Payer: COMMERCIAL

## 2025-07-09 DIAGNOSIS — Z01.818 ENCOUNTER FOR OTHER PREPROCEDURAL EXAMINATION: ICD-10-CM

## 2025-07-09 LAB
ALBUMIN SERPL ELPH-MCNC: 3.5 G/DL — SIGNIFICANT CHANGE UP (ref 3.3–5)
ALP SERPL-CCNC: 104 U/L — SIGNIFICANT CHANGE UP (ref 40–120)
ALT FLD-CCNC: 6 U/L — LOW (ref 10–45)
ANION GAP SERPL CALC-SCNC: 15 MMOL/L — SIGNIFICANT CHANGE UP (ref 5–17)
AST SERPL-CCNC: 13 U/L — SIGNIFICANT CHANGE UP (ref 10–40)
BILIRUB SERPL-MCNC: 0.3 MG/DL — SIGNIFICANT CHANGE UP (ref 0.2–1.2)
BLD GP AB SCN SERPL QL: NEGATIVE — SIGNIFICANT CHANGE UP
BUN SERPL-MCNC: 7 MG/DL — SIGNIFICANT CHANGE UP (ref 7–23)
CALCIUM SERPL-MCNC: 9.6 MG/DL — SIGNIFICANT CHANGE UP (ref 8.4–10.5)
CHLORIDE SERPL-SCNC: 100 MMOL/L — SIGNIFICANT CHANGE UP (ref 96–108)
CO2 SERPL-SCNC: 20 MMOL/L — LOW (ref 22–31)
CREAT SERPL-MCNC: 0.5 MG/DL — SIGNIFICANT CHANGE UP (ref 0.5–1.3)
EGFR: 125 ML/MIN/1.73M2 — SIGNIFICANT CHANGE UP
EGFR: 125 ML/MIN/1.73M2 — SIGNIFICANT CHANGE UP
GLUCOSE SERPL-MCNC: 91 MG/DL — SIGNIFICANT CHANGE UP (ref 70–99)
HCT VFR BLD CALC: 34 % — LOW (ref 34.5–45)
HGB BLD-MCNC: 10.5 G/DL — LOW (ref 11.5–15.5)
MCHC RBC-ENTMCNC: 26 PG — LOW (ref 27–34)
MCHC RBC-ENTMCNC: 30.9 G/DL — LOW (ref 32–36)
MCV RBC AUTO: 84.2 FL — SIGNIFICANT CHANGE UP (ref 80–100)
NRBC # BLD AUTO: 0 K/UL — SIGNIFICANT CHANGE UP (ref 0–0)
NRBC # FLD: 0 K/UL — SIGNIFICANT CHANGE UP (ref 0–0)
NRBC BLD AUTO-RTO: 0 /100 WBCS — SIGNIFICANT CHANGE UP (ref 0–0)
PLATELET # BLD AUTO: 300 K/UL — SIGNIFICANT CHANGE UP (ref 150–400)
PMV BLD: 11.1 FL — SIGNIFICANT CHANGE UP (ref 7–13)
POTASSIUM SERPL-MCNC: 4.3 MMOL/L — SIGNIFICANT CHANGE UP (ref 3.5–5.3)
POTASSIUM SERPL-SCNC: 4.3 MMOL/L — SIGNIFICANT CHANGE UP (ref 3.5–5.3)
PROT SERPL-MCNC: 7 G/DL — SIGNIFICANT CHANGE UP (ref 6–8.3)
RBC # BLD: 4.04 M/UL — SIGNIFICANT CHANGE UP (ref 3.8–5.2)
RBC # FLD: 16.4 % — HIGH (ref 10.3–14.5)
RH IG SCN BLD-IMP: POSITIVE — SIGNIFICANT CHANGE UP
SODIUM SERPL-SCNC: 135 MMOL/L — SIGNIFICANT CHANGE UP (ref 135–145)
WBC # BLD: 10.06 K/UL — SIGNIFICANT CHANGE UP (ref 3.8–10.5)
WBC # FLD AUTO: 10.06 K/UL — SIGNIFICANT CHANGE UP (ref 3.8–10.5)

## 2025-07-09 PROCEDURE — 80053 COMPREHEN METABOLIC PANEL: CPT

## 2025-07-09 PROCEDURE — 85027 COMPLETE CBC AUTOMATED: CPT

## 2025-07-10 ENCOUNTER — TRANSCRIPTION ENCOUNTER (OUTPATIENT)
Age: 35
End: 2025-07-10

## 2025-07-10 ENCOUNTER — INPATIENT (INPATIENT)
Facility: HOSPITAL | Age: 35
LOS: 2 days | Discharge: HOME | End: 2025-07-13
Attending: OBSTETRICS & GYNECOLOGY | Admitting: OBSTETRICS & GYNECOLOGY
Payer: COMMERCIAL

## 2025-07-10 VITALS
DIASTOLIC BLOOD PRESSURE: 70 MMHG | OXYGEN SATURATION: 100 % | SYSTOLIC BLOOD PRESSURE: 108 MMHG | RESPIRATION RATE: 18 BRPM | TEMPERATURE: 98 F

## 2025-07-10 LAB — T PALLIDUM AB TITR SER: NEGATIVE — SIGNIFICANT CHANGE UP

## 2025-07-10 PROCEDURE — 86900 BLOOD TYPING SEROLOGIC ABO: CPT

## 2025-07-10 PROCEDURE — 86901 BLOOD TYPING SEROLOGIC RH(D): CPT

## 2025-07-10 PROCEDURE — 86850 RBC ANTIBODY SCREEN: CPT

## 2025-07-10 DEVICE — SEPRAFILM 5 X 6": Type: IMPLANTABLE DEVICE | Status: FUNCTIONAL

## 2025-07-10 RX ORDER — KETOROLAC TROMETHAMINE 30 MG/ML
30 INJECTION, SOLUTION INTRAMUSCULAR; INTRAVENOUS EVERY 6 HOURS
Refills: 0 | Status: DISCONTINUED | OUTPATIENT
Start: 2025-07-10 | End: 2025-07-12

## 2025-07-10 RX ORDER — IBUPROFEN 200 MG
600 TABLET ORAL EVERY 6 HOURS
Refills: 0 | Status: COMPLETED | OUTPATIENT
Start: 2025-07-10 | End: 2026-06-08

## 2025-07-10 RX ORDER — SODIUM CHLORIDE 9 G/1000ML
1000 INJECTION, SOLUTION INTRAVENOUS
Refills: 0 | Status: DISCONTINUED | OUTPATIENT
Start: 2025-07-10 | End: 2025-07-10

## 2025-07-10 RX ORDER — TERBUTALINE SULFATE 2.5 MG/1
0.25 TABLET ORAL ONCE
Refills: 0 | Status: COMPLETED | OUTPATIENT
Start: 2025-07-10 | End: 2025-07-10

## 2025-07-10 RX ORDER — SIMETHICONE 80 MG
80 TABLET,CHEWABLE ORAL EVERY 4 HOURS
Refills: 0 | Status: DISCONTINUED | OUTPATIENT
Start: 2025-07-10 | End: 2025-07-13

## 2025-07-10 RX ORDER — DIPHENHYDRAMINE HCL 12.5MG/5ML
25 ELIXIR ORAL EVERY 6 HOURS
Refills: 0 | Status: DISCONTINUED | OUTPATIENT
Start: 2025-07-10 | End: 2025-07-13

## 2025-07-10 RX ORDER — CITRIC ACID/SODIUM CITRATE 300-500 MG
30 SOLUTION, ORAL ORAL ONCE
Refills: 0 | Status: DISCONTINUED | OUTPATIENT
Start: 2025-07-10 | End: 2025-07-10

## 2025-07-10 RX ORDER — ACETAMINOPHEN 500 MG/5ML
1000 LIQUID (ML) ORAL ONCE
Refills: 0 | Status: COMPLETED | OUTPATIENT
Start: 2025-07-10 | End: 2025-07-10

## 2025-07-10 RX ORDER — SODIUM CHLORIDE 9 G/1000ML
1000 INJECTION, SOLUTION INTRAVENOUS
Refills: 0 | Status: DISCONTINUED | OUTPATIENT
Start: 2025-07-10 | End: 2025-07-13

## 2025-07-10 RX ORDER — OXYCODONE HYDROCHLORIDE 30 MG/1
5 TABLET ORAL ONCE
Refills: 0 | Status: DISCONTINUED | OUTPATIENT
Start: 2025-07-10 | End: 2025-07-13

## 2025-07-10 RX ORDER — OXYTOCIN-SODIUM CHLORIDE 0.9% IV SOLN 30 UNIT/500ML 30-0.9/5 UT/ML-%
42 SOLUTION INTRAVENOUS
Qty: 30 | Refills: 0 | Status: DISCONTINUED | OUTPATIENT
Start: 2025-07-10 | End: 2025-07-13

## 2025-07-10 RX ORDER — MAGNESIUM HYDROXIDE 400 MG/5ML
30 SUSPENSION ORAL
Refills: 0 | Status: DISCONTINUED | OUTPATIENT
Start: 2025-07-10 | End: 2025-07-13

## 2025-07-10 RX ORDER — ACETAMINOPHEN 500 MG/5ML
975 LIQUID (ML) ORAL
Refills: 0 | Status: DISCONTINUED | OUTPATIENT
Start: 2025-07-10 | End: 2025-07-13

## 2025-07-10 RX ORDER — MODIFIED LANOLIN 100 %
1 CREAM (GRAM) TOPICAL EVERY 6 HOURS
Refills: 0 | Status: DISCONTINUED | OUTPATIENT
Start: 2025-07-10 | End: 2025-07-13

## 2025-07-10 RX ORDER — TRIAMCINOLONE ACETONIDE 200 MG/5ML
10 INJECTION, SUSPENSION INTRA-ARTICULAR; INTRAMUSCULAR ONCE
Refills: 0 | Status: COMPLETED | OUTPATIENT
Start: 2025-07-10 | End: 2025-07-10

## 2025-07-10 RX ORDER — OXYCODONE HYDROCHLORIDE 30 MG/1
5 TABLET ORAL
Refills: 0 | Status: DISCONTINUED | OUTPATIENT
Start: 2025-07-10 | End: 2025-07-13

## 2025-07-10 RX ORDER — ENOXAPARIN SODIUM 100 MG/ML
40 INJECTION SUBCUTANEOUS EVERY 24 HOURS
Refills: 0 | Status: DISCONTINUED | OUTPATIENT
Start: 2025-07-10 | End: 2025-07-13

## 2025-07-10 RX ORDER — CEFAZOLIN SODIUM IN 0.9 % NACL 3 G/100 ML
2000 INTRAVENOUS SOLUTION, PIGGYBACK (ML) INTRAVENOUS ONCE
Refills: 0 | Status: COMPLETED | OUTPATIENT
Start: 2025-07-10 | End: 2025-07-10

## 2025-07-10 RX ADMIN — Medication 400 MILLIGRAM(S): at 00:45

## 2025-07-10 RX ADMIN — KETOROLAC TROMETHAMINE 30 MILLIGRAM(S): 30 INJECTION, SOLUTION INTRAMUSCULAR; INTRAVENOUS at 17:27

## 2025-07-10 RX ADMIN — Medication 1 APPLICATION(S): at 08:00

## 2025-07-10 RX ADMIN — OXYTOCIN-SODIUM CHLORIDE 0.9% IV SOLN 30 UNIT/500ML 42 MILLIUNIT(S)/MIN: 30-0.9/5 SOLUTION at 12:07

## 2025-07-10 RX ADMIN — Medication 100 MILLIGRAM(S): at 08:30

## 2025-07-10 RX ADMIN — Medication 80 MILLIGRAM(S): at 17:28

## 2025-07-10 RX ADMIN — Medication 975 MILLIGRAM(S): at 15:11

## 2025-07-10 RX ADMIN — KETOROLAC TROMETHAMINE 30 MILLIGRAM(S): 30 INJECTION, SOLUTION INTRAMUSCULAR; INTRAVENOUS at 12:00

## 2025-07-10 RX ADMIN — Medication 975 MILLIGRAM(S): at 21:19

## 2025-07-10 RX ADMIN — ENOXAPARIN SODIUM 40 MILLIGRAM(S): 100 INJECTION SUBCUTANEOUS at 21:33

## 2025-07-10 RX ADMIN — KETOROLAC TROMETHAMINE 30 MILLIGRAM(S): 30 INJECTION, SOLUTION INTRAMUSCULAR; INTRAVENOUS at 12:45

## 2025-07-10 RX ADMIN — KETOROLAC TROMETHAMINE 30 MILLIGRAM(S): 30 INJECTION, SOLUTION INTRAMUSCULAR; INTRAVENOUS at 23:59

## 2025-07-10 RX ADMIN — TRIAMCINOLONE ACETONIDE 10 MILLIGRAM(S): 200 INJECTION, SUSPENSION INTRA-ARTICULAR; INTRAMUSCULAR at 09:30

## 2025-07-10 NOTE — OB PROVIDER DELIVERY SUMMARY - NSSELHIDDEN_OBGYN_ALL_OB_FT
[NS_DeliveryAttending1_OBGYN_ALL_OB_FT:[id number]==],[NS_DeliveryAssist1_OBGYN_ALL_OB_FT:[id number]=]

## 2025-07-10 NOTE — OB RN PATIENT PROFILE - SPO2 (%)
"[facility] Medicine Progress Note  Patient Name: Mueksh Addison MRN: [id number]   Patient Class: IP- Inpatient  Length of Stay: 10   Admission Date: 3/19/2020  7:12 PM Attending Physician: Karri Ball MD   Primary Care Provider: Primary Doctor No Face-to-Face encounter date: 03/30/2020   Chief Complaint: Shortness of Breath and Fever    Assessment & Plan:   Mukesh Addison is a 67 y.o. male admitted for     Diagnosis   POA    Suspected Covid-19 Virus Infection [R68.89]   Yes       Priority: 2     Anemia [D64.9]   Yes    Pneumonia of both lungs due to infectious organism [J18.9]   Yes    Pharyngeal dysphagia [R13.13]   Yes    CVA (cerebral vascular accident) [I63.9]   Yes       Chronic    Gait instability [R26.81]   Yes    Aspiration pneumonia [J69.0]   Yes    HTN (hypertension) [I10]   Yes       Resolved Hospital Problems     Diagnosis Date Resolved POA    *Acute hypoxemic respiratory failure [J96.01] 03/27/2020 Yes    Hypophosphatemia [E83.39] 03/27/2020 No    Hypokalemia [E87.6] 03/27/2020 No      COVID 19 positive  Patient made comfort care after discussing with family.   Awaiting hospice.     Discharge Planning:   Comfort care    Subjective:    Interval History: not responsive  Review of Systems All other Review of Systems were found to be negative expect for that mentioned already in HPI.   Objective:   Physical Exam  /78 (BP Location: Right arm, Patient Position: Lying)   Pulse 85   Temp 98.1 °F (36.7 °C) (Axillary)   Resp 17   Ht 5' 5" (1.651 m)   Wt 70.2 kg (154 lb 12.2 oz)   SpO2 100%   BMI 25.75 kg/m²   Vitals reviewed.    Constitutional: unresponsive   HENT: Atraumatic.   Cardiovascular: Normal rate, regular rhythm and normal heart sounds.   Pulmonary/Chest: coarse breath sounds.   Abdominal: Soft.   Neurological: not alert  Skin: Skin is warm and dry.     Following labs were Reviewed   No results for input(s): WBC, HGB, HCT, PLT, GLUCOSE, CALCIUM, ALBUMIN, " PROT, NA, K, CO2, CL, BUN, CREATININE, ALKPHOS, ALT, AST, BILITOT in the last 24 hours.  No results found for: POCTGLUCOSE     All labs within the past 24 hours have been reviewed  Microbiology Results (last 7 days)     Procedure Component Value Units Date/Time    Blood culture x two cultures. Draw prior to antibiotics. [[id number]] Collected:  03/19/20 2005    Order Status:  Completed Specimen:  Blood from Peripheral, Antecubital, Right Updated:  03/24/20 2232     Blood Culture, Routine No growth after 5 days.    Narrative:       Aerobic and anaerobic    Blood culture x two cultures. Draw prior to antibiotics. [[id number]] Collected:  03/19/20 1930    Order Status:  Completed Specimen:  Blood from Peripheral, Upper Arm, Right Updated:  03/24/20 2232     Blood Culture, Routine No growth after 5 days.    Narrative:       Aerobic and anaerobic    Culture, Respiratory with Gram Stain [[id number]] Collected:  03/22/20 1401    Order Status:  Completed Specimen:  Respiratory from Tracheal Aspirate Updated:  03/24/20 0713     Respiratory Culture Reduced normal respiratory indu     Gram Stain (Respiratory) <10 epithelial cells per low power field.     Gram Stain (Respiratory) Few WBC's     Gram Stain (Respiratory) Rare Gram positive cocci        X-Ray Chest AP Portable   Final Result      X-Ray KUB   Final Result      Mild distention of bowel loops suggesting ileus or constipation considering rectal fecal material.         Electronically signed by: Cresencio Shaw MD   Date:    03/24/2020   Time:    14:58      X-Ray Chest 1 View for PICC_Central line   Final Result      Malpositioned feeding tube within right lower lobe.  I called these findings to the ICU nurse caring for the patient at 10:20 am and he reported the tube has already been removed.      Appropriate positioning of right PICC.      Stable interstitial and alveolar opacities within both lungs.         Electronically signed by: Cresencio Shaw MD    Date:    03/24/2020   Time:    10:23      X-Ray Chest AP Portable   Final Result      X-Ray Chest AP Portable   Final Result      Satisfactory position of the endotracheal tube.      Persistent bilateral lung opacity with question of slight improvement compared to 03/22/2020         Electronically signed by: Dane Guevara MD   Date:    03/23/2020   Time:    08:01      X-Ray Chest AP Portable   Final Result      Progression of the interstitial and alveolar opacities in both lungs predominantly in the upper and mid lungs.  Differential includes viral or atypical pneumonia versus edema         Electronically signed by: Roz Eugene MD   Date:    03/22/2020   Time:    07:10      CTA Chest Non Coronary   Final Result      Negative for central pulmonary embolism.  Limited assessment of peripheral pulmonary arteries due to motion artifact.      Patchy opacities within both lungs consistent with multifocal pneumonia.      Atherosclerosis, cholelithiasis, and other incidental findings as above.         Electronically signed by: Cresencio Shaw MD   Date:    03/20/2020   Time:    10:00      X-Ray Chest AP Portable   Final Result      Scattered interstitial opacities throughout both lungs, which given clinical history are suggestive of viral or atypical pneumonia.  No consolidated pneumonia.         Electronically signed by: Alfred Ponce MD   Date:    03/20/2020   Time:    07:52      X-Ray Chest AP Portable   Final Result      1. Faint bilateral pulmonary opacities suspicious for bilateral pulmonary infiltrates, right greater than left.   2. No other significant findings.         Electronically signed by: Gavino Lazo MD   Date:    03/19/2020   Time:    19:41          Inpatient medications  Scheduled Meds:    Continuous Infusions:    PRN Meds:.glycopyrrolate, HYDROmorphone, lorazepam, morphine    Above encounter included review of the medical records, interviewing and examining the patient face-to-face, discussion  with family and other health care providers, ordering and interpreting lab/test results and formulating a plan of care.     Medical Decision Making:    [x] Low Complexity  [] Moderate Complexity  [] High Complexity    Karri Ball  [facility] Hospitalist  03/30/2020     100

## 2025-07-10 NOTE — OB NEONATOLOGY/PEDIATRICIAN DELIVERY SUMMARY - NSPEDSNEONOTESA_OBGYN_ALL_OB_FT
NICU called to delivery for difficult extraction. Arrived to radiant warmer at 90 seconds of life. Infant stimulated, dried, suctioned. Pulse-ox connected to infant, heart rate 120-140 bpm, spo2 saturation 98%. Sacral crease noted, all other PE WNL. Cleared for WBN, at this time.

## 2025-07-10 NOTE — OB RN DELIVERY SUMMARY - NS_SEPSISRSKCALC_OBGYN_ALL_OB_FT
EOS calculated successfully. EOS Risk Factor: 0.5/1000 live births ([facility] national incidence); GA=39w;Temp=98.1; ROM=0.017; GBS='Unknown'; Antibiotics='No antibiotics or any antibiotics < 2 hrs prior to birth'

## 2025-07-10 NOTE — OB RN DELIVERY SUMMARY - NSSELHIDDEN_OBGYN_ALL_OB_FT
[NS_DeliveryAttending1_OBGYN_ALL_OB_FT:[id number]],[NS_DeliveryAssist1_OBGYN_ALL_OB_FT:[email or web address]=],[NS_DeliveryRN_OBGYN_ALL_OB_FT:[id number]]

## 2025-07-10 NOTE — OB PROVIDER H&P - HISTORY OF PRESENT ILLNESS
PHU FINCH is a 34yo  at 39w0d with breech presentation presenting for scheduled ECV. She denies LOF, ctx, VB, LOF.    Ante: Spontaneous conception. NIPT LR. Anatomy scan significant for low-lying placenta which resolved and trace pericardial effusion which resolved. Failed GCT, passed GTT. Denies elevated BP. GBS - EFW 3300g    OBHx: G1  FT , 3430g. G2 current   GYNHx: denies abnormal pap, STI, fibroids, ovarian cysts  PMHx: denies  meds: PNV  PSH: denies   allergies: NKDA    PE:  GEN: well-appearing, NAD  PULM: no increased WOB  ABD: soft, nontender, gravid  EXT: mild LE edema  TAUS: breech      FHT: baseline ***, moderate variability, +accels, no decels  TOCO: ctx ***  reactive & reassuring     A&P: 34yo  at 39w0d with breech presentation presenting for scheduled ECV. Fetal status reassuring.   - Admit to L&D  - Consents signed for ECV and C/S   - PN reviewed, GBS-  - NPO, IVF  - continuous tocometry, FHT   - proceed with external cephalic version  - If successful will proceed with C/S  - If successful will consent for IOL/vaginal delivery    D/W Dr. Watts attending OB PHU FINCH is a 34yo  at 39w0d with breech presentation presenting for scheduled ECV. She denies LOF, ctx, VB, LOF.    Ante: Spontaneous conception. NIPT LR. Anatomy scan significant for low-lying placenta which resolved and trace pericardial effusion which resolved. Failed GCT, passed GTT. Denies elevated BP. GBS - EFW 3300g    OBHx: G1  FT , 3430g. G2 current   GYNHx: denies abnormal pap, STI, fibroids, ovarian cysts  PMHx: denies  meds: PNV  PSH: denies   allergies: NKDA    PE:  GEN: well-appearing, NAD  PULM: no increased WOB  ABD: soft, nontender, gravid  EXT: mild LE edema  TAUS: breech    First 20min on monitor:  FHT: baseline 140, moderate variability, no accels, no decels  TOCO: ctx 2 in 10min (not feeling them)   continuous monitoring in recovery - will monitor for accels, not yet reactive     A&P: 34yo  at 39w0d with breech presentation presenting for scheduled ECV.   - Admit to L&D  - Consents signed for ECV and C/S   - f/u tracing for accels  - PN reviewed, GBS-  - NPO, IVF  - continuous tocometry, FHT   - proceed with external cephalic version  - If successful will proceed with C/S  - If successful will consent for IOL/vaginal delivery    D/W Dr. Watts attending OB

## 2025-07-10 NOTE — OB RN INTRAOPERATIVE NOTE - NSSELHIDDEN_OBGYN_ALL_OB_FT
[NS_DeliveryAttending1_OBGYN_ALL_OB_FT:[id number]==],[NS_DeliveryAssist1_OBGYN_ALL_OB_FT:[email or web address]=],[NS_DeliveryRN_OBGYN_ALL_OB_FT:[id number]=]

## 2025-07-10 NOTE — OB PROVIDER H&P - NSLOWPPHRISK_OBGYN_A_OB
No previous uterine incision/Bailey Pregnancy/Less than or equal to 4 previous vaginal births/No known bleeding disorder/No history of postpartum hemorrhage/No other PPH risks indicated

## 2025-07-11 ENCOUNTER — APPOINTMENT (OUTPATIENT)
Dept: ANTEPARTUM | Facility: CLINIC | Age: 35
End: 2025-07-11

## 2025-07-11 ENCOUNTER — TRANSCRIPTION ENCOUNTER (OUTPATIENT)
Age: 35
End: 2025-07-11

## 2025-07-11 LAB
BASOPHILS # BLD AUTO: 0.04 K/UL — SIGNIFICANT CHANGE UP (ref 0–0.2)
BASOPHILS NFR BLD AUTO: 0.3 % — SIGNIFICANT CHANGE UP (ref 0–2)
EOSINOPHIL # BLD AUTO: 0.06 K/UL — SIGNIFICANT CHANGE UP (ref 0–0.5)
EOSINOPHIL NFR BLD AUTO: 0.4 % — SIGNIFICANT CHANGE UP (ref 0–6)
HCT VFR BLD CALC: 24.6 % — LOW (ref 34.5–45)
HGB BLD-MCNC: 7.6 G/DL — LOW (ref 11.5–15.5)
IMM GRANULOCYTES # BLD AUTO: 0.16 K/UL — HIGH (ref 0–0.07)
IMM GRANULOCYTES NFR BLD AUTO: 1.1 % — HIGH (ref 0–0.9)
LYMPHOCYTES # BLD AUTO: 2.13 K/UL — SIGNIFICANT CHANGE UP (ref 1–3.3)
LYMPHOCYTES NFR BLD AUTO: 15.3 % — SIGNIFICANT CHANGE UP (ref 13–44)
MCHC RBC-ENTMCNC: 26 PG — LOW (ref 27–34)
MCHC RBC-ENTMCNC: 30.9 G/DL — LOW (ref 32–36)
MCV RBC AUTO: 84.2 FL — SIGNIFICANT CHANGE UP (ref 80–100)
MONOCYTES # BLD AUTO: 0.96 K/UL — HIGH (ref 0–0.9)
MONOCYTES NFR BLD AUTO: 6.9 % — SIGNIFICANT CHANGE UP (ref 2–14)
NEUTROPHILS # BLD AUTO: 10.6 K/UL — HIGH (ref 1.8–7.4)
NEUTROPHILS NFR BLD AUTO: 76 % — SIGNIFICANT CHANGE UP (ref 43–77)
NRBC # BLD AUTO: 0 K/UL — SIGNIFICANT CHANGE UP (ref 0–0)
NRBC # FLD: 0 K/UL — SIGNIFICANT CHANGE UP (ref 0–0)
NRBC BLD AUTO-RTO: 0 /100 WBCS — SIGNIFICANT CHANGE UP (ref 0–0)
PLATELET # BLD AUTO: 260 K/UL — SIGNIFICANT CHANGE UP (ref 150–400)
PMV BLD: 11.4 FL — SIGNIFICANT CHANGE UP (ref 7–13)
RBC # BLD: 2.92 M/UL — LOW (ref 3.8–5.2)
RBC # FLD: 16.9 % — HIGH (ref 10.3–14.5)
WBC # BLD: 13.95 K/UL — HIGH (ref 3.8–10.5)
WBC # FLD AUTO: 13.95 K/UL — HIGH (ref 3.8–10.5)

## 2025-07-11 PROCEDURE — 86900 BLOOD TYPING SEROLOGIC ABO: CPT

## 2025-07-11 PROCEDURE — 85025 COMPLETE CBC W/AUTO DIFF WBC: CPT

## 2025-07-11 PROCEDURE — 59050 FETAL MONITOR W/REPORT: CPT

## 2025-07-11 PROCEDURE — 86901 BLOOD TYPING SEROLOGIC RH(D): CPT

## 2025-07-11 PROCEDURE — 36415 COLL VENOUS BLD VENIPUNCTURE: CPT

## 2025-07-11 PROCEDURE — 86850 RBC ANTIBODY SCREEN: CPT

## 2025-07-11 PROCEDURE — C1765: CPT

## 2025-07-11 RX ORDER — TOUCHLESS CARE ZINC OXIDE PROTECTANT 20; 25 G/100G; G/100G
1 SPRAY TOPICAL ONCE
Refills: 0 | Status: COMPLETED | OUTPATIENT
Start: 2025-07-11 | End: 2025-07-11

## 2025-07-11 RX ORDER — IRON SUCROSE 20 MG/ML
300 INJECTION, SOLUTION INTRAVENOUS EVERY 24 HOURS
Refills: 0 | Status: COMPLETED | OUTPATIENT
Start: 2025-07-11 | End: 2025-07-12

## 2025-07-11 RX ADMIN — KETOROLAC TROMETHAMINE 30 MILLIGRAM(S): 30 INJECTION, SOLUTION INTRAMUSCULAR; INTRAVENOUS at 18:08

## 2025-07-11 RX ADMIN — TOUCHLESS CARE ZINC OXIDE PROTECTANT 1 APPLICATION(S): 20; 25 SPRAY TOPICAL at 23:16

## 2025-07-11 RX ADMIN — ENOXAPARIN SODIUM 40 MILLIGRAM(S): 100 INJECTION SUBCUTANEOUS at 21:18

## 2025-07-11 RX ADMIN — Medication 975 MILLIGRAM(S): at 08:40

## 2025-07-11 RX ADMIN — Medication 975 MILLIGRAM(S): at 15:12

## 2025-07-11 RX ADMIN — IRON SUCROSE 176.67 MILLIGRAM(S): 20 INJECTION, SOLUTION INTRAVENOUS at 15:12

## 2025-07-11 RX ADMIN — KETOROLAC TROMETHAMINE 30 MILLIGRAM(S): 30 INJECTION, SOLUTION INTRAMUSCULAR; INTRAVENOUS at 23:16

## 2025-07-11 RX ADMIN — KETOROLAC TROMETHAMINE 30 MILLIGRAM(S): 30 INJECTION, SOLUTION INTRAMUSCULAR; INTRAVENOUS at 11:51

## 2025-07-11 RX ADMIN — Medication 975 MILLIGRAM(S): at 21:18

## 2025-07-11 RX ADMIN — KETOROLAC TROMETHAMINE 30 MILLIGRAM(S): 30 INJECTION, SOLUTION INTRAMUSCULAR; INTRAVENOUS at 05:49

## 2025-07-11 RX ADMIN — Medication 975 MILLIGRAM(S): at 03:11

## 2025-07-11 RX ADMIN — Medication 80 MILLIGRAM(S): at 08:40

## 2025-07-11 NOTE — DISCHARGE NOTE OB - MEDICATION SUMMARY - MEDICATIONS TO TAKE
I will START or STAY ON the medications listed below when I get home from the hospital:    ibuprofen 600 mg oral tablet  -- 1 tab(s) by mouth every 6 hours  -- Indication: For Postpartum state    acetaminophen 325 mg oral tablet  -- 3 tab(s) by mouth every 6 hours  -- Indication: For Postpartum state

## 2025-07-11 NOTE — DISCHARGE NOTE OB - PATIENT PORTAL LINK FT
You can access the FollowMyHealth Patient Portal offered by [facility] by registering at the following website: [email or web address]. By joining [employer]’s FollowMyHealth portal, you will also be able to view your health information using other applications (apps) compatible with our system.

## 2025-07-11 NOTE — PROGRESS NOTE ADULT - ATTENDING COMMENTS
Agree with above  P1 POD1 s/p 1'CS breech, doing well.  VSS, pain well controlled, normal lochia. Contreras out and voiding.  Has ambulated and is tolerating PO.  hgb down to 7.6 from 10.5 pre-op, will administer venofer while admitted  DC pending

## 2025-07-11 NOTE — DISCHARGE NOTE OB - CARE PROVIDER_API CALL
Josseline Mae  Obstetrics & Gynecology  [address] [place] [address], [place] [address]  Phone: [phone]  Fax: [phone]  Follow Up Time:

## 2025-07-11 NOTE — DISCHARGE NOTE OB - NS MD DC FALL RISK RISK
For information on Fall & Injury Prevention, visit: [email or web address] OR  [email or web address] OR  https://www.cdc.gov/steadi/patient.html

## 2025-07-11 NOTE — DISCHARGE NOTE OB - FINANCIAL ASSISTANCE
[facility] provides services at a reduced cost to those who are determined to be eligible through [facility]’s financial assistance program. Information regarding [facility]’s financial assistance program can be found by going to [email or web address] or by calling [phone].

## 2025-07-12 PROCEDURE — 86901 BLOOD TYPING SEROLOGIC RH(D): CPT

## 2025-07-12 PROCEDURE — 59050 FETAL MONITOR W/REPORT: CPT

## 2025-07-12 PROCEDURE — C1765: CPT

## 2025-07-12 PROCEDURE — 36415 COLL VENOUS BLD VENIPUNCTURE: CPT

## 2025-07-12 PROCEDURE — 86850 RBC ANTIBODY SCREEN: CPT

## 2025-07-12 PROCEDURE — 86900 BLOOD TYPING SEROLOGIC ABO: CPT

## 2025-07-12 PROCEDURE — 85025 COMPLETE CBC W/AUTO DIFF WBC: CPT

## 2025-07-12 RX ORDER — IBUPROFEN 200 MG
600 TABLET ORAL EVERY 6 HOURS
Refills: 0 | Status: DISCONTINUED | OUTPATIENT
Start: 2025-07-12 | End: 2025-07-13

## 2025-07-12 RX ADMIN — Medication 975 MILLIGRAM(S): at 21:05

## 2025-07-12 RX ADMIN — Medication 600 MILLIGRAM(S): at 18:34

## 2025-07-12 RX ADMIN — Medication 975 MILLIGRAM(S): at 09:33

## 2025-07-12 RX ADMIN — Medication 600 MILLIGRAM(S): at 23:59

## 2025-07-12 RX ADMIN — Medication 600 MILLIGRAM(S): at 12:07

## 2025-07-12 RX ADMIN — KETOROLAC TROMETHAMINE 30 MILLIGRAM(S): 30 INJECTION, SOLUTION INTRAMUSCULAR; INTRAVENOUS at 05:52

## 2025-07-12 RX ADMIN — IRON SUCROSE 176.67 MILLIGRAM(S): 20 INJECTION, SOLUTION INTRAVENOUS at 16:32

## 2025-07-12 RX ADMIN — ENOXAPARIN SODIUM 40 MILLIGRAM(S): 100 INJECTION SUBCUTANEOUS at 21:05

## 2025-07-12 RX ADMIN — Medication 975 MILLIGRAM(S): at 03:12

## 2025-07-12 RX ADMIN — Medication 975 MILLIGRAM(S): at 15:36

## 2025-07-13 VITALS
SYSTOLIC BLOOD PRESSURE: 112 MMHG | RESPIRATION RATE: 17 BRPM | HEART RATE: 88 BPM | TEMPERATURE: 98 F | DIASTOLIC BLOOD PRESSURE: 67 MMHG | OXYGEN SATURATION: 97 %

## 2025-07-13 PROCEDURE — 86901 BLOOD TYPING SEROLOGIC RH(D): CPT

## 2025-07-13 PROCEDURE — 59050 FETAL MONITOR W/REPORT: CPT

## 2025-07-13 PROCEDURE — C1765: CPT

## 2025-07-13 PROCEDURE — 85025 COMPLETE CBC W/AUTO DIFF WBC: CPT

## 2025-07-13 PROCEDURE — 36415 COLL VENOUS BLD VENIPUNCTURE: CPT

## 2025-07-13 PROCEDURE — 86850 RBC ANTIBODY SCREEN: CPT

## 2025-07-13 PROCEDURE — 86900 BLOOD TYPING SEROLOGIC ABO: CPT

## 2025-07-13 RX ADMIN — Medication 975 MILLIGRAM(S): at 08:54

## 2025-07-13 RX ADMIN — Medication 975 MILLIGRAM(S): at 03:09

## 2025-07-13 RX ADMIN — Medication 600 MILLIGRAM(S): at 05:52

## 2025-07-13 RX ADMIN — Medication 975 MILLIGRAM(S): at 15:37

## 2025-07-13 RX ADMIN — Medication 600 MILLIGRAM(S): at 13:20

## 2025-07-13 NOTE — PROGRESS NOTE ADULT - SUBJECTIVE AND OBJECTIVE BOX
Patient evaluated at bedside this morning, resting comfortable in bed, with no acute events overnight.  She reports pain is well controlled with oral pain medications.   She denies vomiting or heavy vaginal bleeding.  She has not tried ambulating since procedure, butler remains in place at this time. Tolerating diet. Passing flatus.     Physical Exam:  Vital Signs Last 24 Hrs  T(C): 36.7 (11 Jul 2025 06:12), Max: 37 (10 Jul 2025 18:00)  T(F): 98.1 (11 Jul 2025 06:12), Max: 98.6 (10 Jul 2025 18:00)  HR: 88 (11 Jul 2025 06:12) (75 - 88)  BP: 92/60 (11 Jul 2025 06:12) (92/60 - 110/56)  BP(mean): 81 (10 Jul 2025 14:48) (81 - 81)  RR: 18 (11 Jul 2025 06:12) (17 - 18)  SpO2: 95% (11 Jul 2025 06:12) (95% - 100%)    Parameters below as of 11 Jul 2025 06:12  Patient On (Oxygen Delivery Method): room air        GA: NAD, A+0 x 3  Pulm: no increased WOB  Abd: soft, nontender, mildly distended, no rebound or guarding, incision clean, dry and intact, uterus firm at midline, 2 fb below umbilicus  : butler in situ, lochia WNL  Extremities: mild lower extremity edema, no calf tenderness, SCDs in place                            10.5   10.06 )-----------( 300      ( 09 Jul 2025 12:54 )             34.0     07-09    135  |  100  |  7   ----------------------------<  91  4.3   |  20[L]  |  0.50    Ca    9.6      09 Jul 2025 12:54    TPro  7.0  /  Alb  3.5  /  TBili  0.3  /  DBili  x   /  AST  13  /  ALT  6[L]  /  AlkPhos  104  07-09        acetaminophen     Tablet .. 975 milliGRAM(s) Oral <User Schedule>  diphenhydrAMINE 25 milliGRAM(s) Oral every 6 hours PRN  diphtheria/tetanus/pertussis (acellular) Vaccine (Adacel) 0.5 milliLiter(s) IntraMuscular once  enoxaparin Injectable 40 milliGRAM(s) SubCutaneous every 24 hours  ibuprofen  Tablet. 600 milliGRAM(s) Oral every 6 hours  ketorolac   Injectable 30 milliGRAM(s) IV Push every 6 hours  lactated ringers. 1000 milliLiter(s) IV Continuous <Continuous>  lanolin Ointment 1 Application(s) Topical every 6 hours PRN  magnesium hydroxide Suspension 30 milliLiter(s) Oral two times a day PRN  oxyCODONE    IR 5 milliGRAM(s) Oral every 3 hours PRN  oxyCODONE    IR 5 milliGRAM(s) Oral once PRN  oxytocin Infusion 42 milliUNIT(s)/Min IV Continuous <Continuous>  simethicone 80 milliGRAM(s) Chew every 4 hours PRN      35y Female POD#1  s/p C/S, Uncomplicated                                     - VSS  - Neuro/Pain:  toradol atc, tylenol atc, oxy prn  - CV:  VS per routine, AM CBC pending  - Pulm: Encourage ISS & Ambulation  - GI: Advance as tolerated  - : Butler in place, to be removed this morning, TOV this afternoon  - DVT ppx: SCDs, Lovenox 40mg QD  - Dispo: POD #3/4
Patient evaluated at bedside this morning, resting comfortable in bed.   She reports pain is well controlled with oral pain medications.   She denies  vomiting or heavy vaginal bleeding.  She has been ambulating without assistance, voiding spontaneously, passing gas, tolerating regular diet.     Physical Exam:  Vital Signs Last 24 Hrs  T(C): 36.8 (2025 06:06), Max: 37.5 (2025 22:00)  T(F): 98.3 (2025 06:06), Max: 99.5 (2025 22:00)  HR: 105 (2025 06:06) (73 - 105)  BP: 104/72 (2025 06:06) (88/63 - 104/72)  BP(mean): --  RR: 18 (2025 06:06) (18 - 18)  SpO2: 97% (2025 06:06) (97% - 97%)    Parameters below as of 2025 06:06  Patient On (Oxygen Delivery Method): room air        GA: NAD, A+0 x 3  Pulm: no increased WOB  Abd: soft, nontender, mildly distended, no rebound or guarding, incision clean, dry and intact, uterus firm at midline, 2 fb below umbilicus  Extremities: mild lower extremity edema, no calf tenderness                             7.6    13.95 )-----------( 260      ( 2025 05:30 )             24.6          A/P:  s/p  section, POD#2, stable  - VSS  -  Pain: PO motrin q6hrs, tylenol q6hrs, oxycodone for severe pain PRN  -  Post-operatively labs: hemodynamically stable, no symptoms of anemia   -  GI: tolerating regular diet, passing gas  -  : s/p butler , urinating without difficulty  -  DVT prophylaxis: encouraged increased ambulation, SCDs, SQL  -  Dispo: POD 3 or 4
Patient evaluated at bedside this morning, resting comfortable in bed.   She reports pain is well controlled.  She denies headache, dizziness, chest pain, palpitations, shortness of breath, nausea, vomiting or heavy vaginal bleeding.  She has been ambulating without assistance, voiding spontaneously, passing gas, tolerating regular diet.    Physical Exam:  Vital Signs Last 24 Hrs  T(C): 36.7 (13 Jul 2025 06:00), Max: 37.1 (12 Jul 2025 18:14)  T(F): 98 (13 Jul 2025 06:00), Max: 98.7 (12 Jul 2025 18:14)  HR: 101 (13 Jul 2025 06:00) (83 - 108)  BP: 111/74 (13 Jul 2025 06:00) (96/64 - 121/79)  BP(mean): --  RR: 18 (13 Jul 2025 06:00) (18 - 18)  SpO2: 95% (13 Jul 2025 06:00) (95% - 100%)    Parameters below as of 12 Jul 2025 18:14  Patient On (Oxygen Delivery Method): room air        GA: NAD, A+0 x 3  Abd: soft, nontender, nondistended, no rebound or guarding, incision clean, dry and intact, uterus firm at midline and below umbilicus  : lochia WNL  Extremities: no swelling or calf tenderness

## 2025-07-18 DIAGNOSIS — Z3A.39 39 WEEKS GESTATION OF PREGNANCY: ICD-10-CM

## 2025-07-18 DIAGNOSIS — Z28.09 IMMUNIZATION NOT CARRIED OUT BECAUSE OF OTHER CONTRAINDICATION: ICD-10-CM

## 2025-07-18 NOTE — OB PROVIDER H&P - NSSCHADMISSION_OBGYN_A_OB
Detail Level: Detailed
Size Of Lesion: 6x6mm
Size Of Lesion: 2x3mm
Size Of Lesion: 5x4mm
Size Of Lesion: 4x4mm
Size Of Lesion: 4x3mm
Size Of Lesion: 1x1mm
Size Of Lesion: 2x1mm
Size Of Lesion: 5x3mm
Size Of Lesion: 2x2mm
Size Of Lesion: 3x3mm
Size Of Lesion: 3x2mm
Size Of Lesion: 3x1mm
Size Of Lesion: [id number]
Yes